# Patient Record
Sex: FEMALE | Race: BLACK OR AFRICAN AMERICAN | NOT HISPANIC OR LATINO | Employment: FULL TIME | URBAN - METROPOLITAN AREA
[De-identification: names, ages, dates, MRNs, and addresses within clinical notes are randomized per-mention and may not be internally consistent; named-entity substitution may affect disease eponyms.]

---

## 2023-05-23 PROBLEM — N91.5 OLIGOMENORRHEA: Status: ACTIVE | Noted: 2023-05-23

## 2023-07-18 PROBLEM — Z01.419 ENCOUNTER FOR WELL WOMAN EXAM WITH ROUTINE GYNECOLOGICAL EXAM: Status: ACTIVE | Noted: 2023-07-18

## 2023-08-23 PROBLEM — K59.04 CHRONIC IDIOPATHIC CONSTIPATION: Status: ACTIVE | Noted: 2023-08-23

## 2023-09-22 PROBLEM — K62.89 RECTAL PAIN: Status: ACTIVE | Noted: 2023-09-22

## 2024-07-10 ENCOUNTER — ON-DEMAND VIRTUAL (OUTPATIENT)
Dept: URGENT CARE | Facility: CLINIC | Age: 24
End: 2024-07-10

## 2024-07-10 DIAGNOSIS — K59.00 CONSTIPATION, UNSPECIFIED CONSTIPATION TYPE: Primary | ICD-10-CM

## 2024-07-10 NOTE — PROGRESS NOTES
Subjective:      Patient ID: Carlton Calvillo is a 24 y.o. female.    Vitals:  vitals were not taken for this visit.     Chief Complaint: GI Problem      Visit Type: TELE AUDIOVISUAL    Present with the patient at the time of consultation: TELEMED PRESENT WITH PATIENT: None at home in [place]    Past Medical History:   Diagnosis Date    Constipation     Oligomenorrhea      No past surgical history on file.  Review of patient's allergies indicates:   Allergen Reactions    Fish containing products Rash     Current Outpatient Medications on File Prior to Visit   Medication Sig Dispense Refill    cetirizine (ZYRTEC) 10 MG tablet Take 1 tablet (10 mg total) by mouth once daily. (Patient not taking: Reported on 4/13/2024) 30 tablet 0    fluticasone propionate (FLONASE) 50 mcg/actuation nasal spray 1 spray (50 mcg total) by Each Nostril route once daily. (Patient not taking: Reported on 4/13/2024) 16 g 0    nitroglycerin (RECTIV) 0.4 % (w/w) Oint Place 1 application  rectally once daily. Use gloved finger about 1 inch into rectum. Wash hands after applying. Do not use more than 3 weeks (Patient not taking: Reported on 9/18/2023) 30 g 0     No current facility-administered medications on file prior to visit.     Family History   Problem Relation Name Age of Onset    Diabetes Mother      Hypertension Mother      No Known Problems Father             [facility] Intake    7/10/2024  4:48 PM CDT - Filed by Patient   What is your current physical address in the event of a medical emergency? [address]   Are you able to take your vital signs? No   Please attach any relevant images or files          HPI  23yo female with h/o chronic constipation and fecal impactions presents with concern for fecal impaction x one week. Feels bloated, low appetite. Tried to disimpact herself and feels hard stool in rectum and having pain. Tried to give herself enema without relief.  Miralax also tried.       Constitution: Positive  for appetite change. Negative for fever.   Gastrointestinal:  Positive for abdominal bloating, constipation and rectal pain. Negative for abdominal pain, nausea, vomiting, diarrhea, bright red blood in stool and dark colored stools.        Objective:   The physical exam was conducted virtually.  Physical Exam   Constitutional: She is oriented to person, place, and time.  Non-toxic appearance. She does not appear ill. No distress.   HENT:   Head: Normocephalic and atraumatic.   Pulmonary/Chest: Effort normal. No respiratory distress.   Abdominal: Normal appearance.   Neurological: She is alert and oriented to person, place, and time.   Skin: Skin is not diaphoretic, not pale and no rash.   Psychiatric: Her behavior is normal. Judgment and thought content normal.       Assessment:     1. Constipation, unspecified constipation type        Plan:       Constipation, unspecified constipation type      Needs in person evaluation. Pt VU and will go to the emergency room for further exam.